# Patient Record
Sex: FEMALE
[De-identification: names, ages, dates, MRNs, and addresses within clinical notes are randomized per-mention and may not be internally consistent; named-entity substitution may affect disease eponyms.]

---

## 2021-11-18 ENCOUNTER — DASHBOARD ENCOUNTERS (OUTPATIENT)
Age: 66
End: 2021-11-18

## 2021-11-18 ENCOUNTER — OFFICE VISIT (OUTPATIENT)
Dept: URBAN - METROPOLITAN AREA CLINIC 128 | Facility: CLINIC | Age: 66
End: 2021-11-18
Payer: MEDICARE

## 2021-11-18 DIAGNOSIS — D86.0 PULMONARY SARCOIDOSIS: ICD-10-CM

## 2021-11-18 DIAGNOSIS — K57.90 DIVERTICULOSIS: ICD-10-CM

## 2021-11-18 DIAGNOSIS — R10.32 LLQ ABDOMINAL PAIN: ICD-10-CM

## 2021-11-18 DIAGNOSIS — Z12.11 COLON CANCER SCREENING: ICD-10-CM

## 2021-11-18 PROBLEM — 397881000: Status: ACTIVE | Noted: 2021-11-18

## 2021-11-18 PROBLEM — 24369008: Status: ACTIVE | Noted: 2021-11-18

## 2021-11-18 PROCEDURE — 99203 OFFICE O/P NEW LOW 30 MIN: CPT | Performed by: INTERNAL MEDICINE

## 2021-11-18 RX ORDER — CHOLECALCIFEROL (VITAMIN D3) 125 MCG
1 TABLET TABLET ORAL ONCE A DAY
Status: ACTIVE | COMMUNITY

## 2021-11-18 RX ORDER — BROMPHENIRAMINE MALEATE, PHENYLEPHRINE HCL 1; 2.5 MG/5ML; MG/5ML
20 ML AS NEEDED LIQUID ORAL
Status: ACTIVE | COMMUNITY

## 2021-11-18 NOTE — HPI-TODAY'S VISIT:
Mrs Leyva is a pleasant 67yo woman who is due for colon cancer screening.  Last attempt at colonscopy was 11 years ago with exam only possible into the sigmoid colon because of tortuousity felt related to adhesions.  ACBE followed that was normal except for diverticulosis.  She has undergone multiple abdominal procedures and has chronic intermittent LLQ pain felt related to adhesions.  Her bowel movements have been fairly regular with some use of MOM 1-2 times a month for constipation and bloating.  Weight is stable to increased this past year.  NO UGI symptoms.  No blood thinners.  No family history of colon cancer or colon polyps.   No heart or renal disease.  She has egg allergy and cannot take flu or covid vaccine on this basis.  There is also history of pulmonary sarcoidosis that has been fairly stable -- no use of oxygen.  Able to walk at reasonable pace without stopping due to SOB.

## 2021-11-18 NOTE — PHYSICAL EXAM GASTROINTESTINAL
Abdomen soft, tender low midline and LLQ, well healed surgical scars, nondistended, no masses palpable , normal bowel sounds   Liver and Spleen , no hepatomegaly present, liver edge nontender , spleen not palpable   Rectal: deferred

## 2022-04-18 PROBLEM — 305058001: Status: ACTIVE | Noted: 2022-04-18

## 2022-05-03 ENCOUNTER — OFFICE VISIT (OUTPATIENT)
Dept: URBAN - METROPOLITAN AREA MEDICAL CENTER 25 | Facility: MEDICAL CENTER | Age: 67
End: 2022-05-03